# Patient Record
Sex: MALE | Race: WHITE
[De-identification: names, ages, dates, MRNs, and addresses within clinical notes are randomized per-mention and may not be internally consistent; named-entity substitution may affect disease eponyms.]

---

## 2018-06-15 ENCOUNTER — HOSPITAL ENCOUNTER (EMERGENCY)
Dept: HOSPITAL 76 - ED | Age: 38
Discharge: HOME | End: 2018-06-15
Payer: COMMERCIAL

## 2018-06-15 VITALS — SYSTOLIC BLOOD PRESSURE: 156 MMHG | DIASTOLIC BLOOD PRESSURE: 98 MMHG

## 2018-06-15 DIAGNOSIS — H60.92: Primary | ICD-10-CM

## 2018-06-15 PROCEDURE — 99283 EMERGENCY DEPT VISIT LOW MDM: CPT

## 2018-06-15 NOTE — ED PHYSICIAN DOCUMENTATION
PD HPI HEENT





- Stated complaint


Stated Complaint: L EAR PX





- Chief complaint


Chief Complaint: Heent





- History obtained from


History obtained from: Patient





- History of Present Illness


Timing - onset: How many days ago (4)


Timing - details: Gradual onset, Still present


Location: Left ear


Worsens: Swalllowing


Associated symptoms: No: Fever


Similar symptoms before: Work up / diagnostics


Recently seen: Clinic





- Additional information


Additional information: 





Patient is a 37 year old male with no significant past medical history who is 

presenting to the emergency department for left sided ear pain.  Patient states 

that he went to an urgent care, they could not see his eardrum but they started 

him on a z-pack.  patient states that the pain has becoming worse and he now 

has discharge coming from his ear.  





Review of Systems


Ten Systems: 10 systems reviewed and negative


Constitutional: denies: Fever, Chills


Ears: reports: Ear pain, Drainage/discharge





PD PAST MEDICAL HISTORY





- Present Medications


Home Medications: 


 Ambulatory Orders











 Medication  Instructions  Recorded  Confirmed


 


Ciprofloxacin/Hydrocortisone 4 drops OT TID #1 drops.susp 06/15/18 





[Cipro Hc Otic Suspension]   


 


busPIRone [Buspar]  06/15/18 














- Allergies


Allergies/Adverse Reactions: 


 Allergies











Allergy/AdvReac Type Severity Reaction Status Date / Time


 


Penicillins Allergy  Rash Verified 06/15/18 18:49














PD ED PE NORMAL





- Vitals


Vital signs reviewed: Yes





- General


General: Alert and oriented X 3





- HEENT


HEENT: Atraumatic, Moist mucous membranes, Pharynx benign, Dentition benign





- Neck


Neck: Supple, no meningeal sign





- Respiratory


Respiratory: No respiratory distress





- Abdomen


Abdomen: Non distended





- Derm


Derm: Normal color, Warm and dry





- Extremities


Extremities: No deformity





- Neuro


Neuro: Alert and oriented X 3, No motor deficit, Normal speech


Eye Opening: Spontaneous


Motor: Obeys Commands


Verbal: Oriented


GCS Score: 15





PD ED PE EXPANDED





- HEENT


HEENT: Other (discharge from left external ear canal)





Results





- Vitals


Vitals: 





 Vital Signs - 24 hr











  06/15/18





  18:45


 


Temperature 37.2 C


 


Heart Rate 113 H


 


Respiratory 18





Rate 


 


Blood Pressure 156/98 H


 


O2 Saturation 98








 Oxygen











O2 Source                      Room air

















PD MEDICAL DECISION MAKING





- ED course


Complexity details: reviewed old records, reviewed results, re-evaluated patient

, considered differential, d/w patient


ED course: 





Patient was seen and examined at bedside.  patient had otitis externa.  patient 

was treated with ibuprofen and ciprodex.  Patient required no further inpatient 

work up and was stable for discharge with outpatient follow up.  





- Sepsis Event


Vital Signs: 





 Vital Signs - 24 hr











  06/15/18





  18:45


 


Temperature 37.2 C


 


Heart Rate 113 H


 


Respiratory 18





Rate 


 


Blood Pressure 156/98 H


 


O2 Saturation 98








 Oxygen











O2 Source                      Room air

















Departure





- Departure


Disposition: 01 Home, Self Care


Clinical Impression: 


 Otitis externa





Condition: Good


Instructions:  ED Otitis Externa


Follow-Up: 


primary,care provider [Other] - Within 3 Days


Prescriptions: 


Ciprofloxacin/Hydrocortisone [Cipro Hc Otic Suspension] 4 drops OT TID #1 

drops.susp


Comments: 


Your sympotms today are being caused by an external ear infection.  You will 

need to start antibiotic ear drops.  you should take motrin or tylenol as 

needed for pain.  You should follow up with your doctor monday if your symptoms 

aren't improving.  You may return to the emergency department at any time for 

new, worsening or uncontrollable symptoms.